# Patient Record
Sex: FEMALE | Race: WHITE
[De-identification: names, ages, dates, MRNs, and addresses within clinical notes are randomized per-mention and may not be internally consistent; named-entity substitution may affect disease eponyms.]

---

## 2019-06-28 ENCOUNTER — HOSPITAL ENCOUNTER (EMERGENCY)
Dept: HOSPITAL 72 - EMR | Age: 74
Discharge: HOME | End: 2019-06-28
Payer: MEDICARE

## 2019-06-28 VITALS — SYSTOLIC BLOOD PRESSURE: 200 MMHG | DIASTOLIC BLOOD PRESSURE: 86 MMHG

## 2019-06-28 VITALS — BODY MASS INDEX: 18.25 KG/M2 | WEIGHT: 103 LBS | HEIGHT: 63 IN

## 2019-06-28 VITALS — DIASTOLIC BLOOD PRESSURE: 74 MMHG | SYSTOLIC BLOOD PRESSURE: 150 MMHG

## 2019-06-28 DIAGNOSIS — L03.116: Primary | ICD-10-CM

## 2019-06-28 DIAGNOSIS — I10: ICD-10-CM

## 2019-06-28 DIAGNOSIS — Z88.6: ICD-10-CM

## 2019-06-28 PROCEDURE — 99283 EMERGENCY DEPT VISIT LOW MDM: CPT

## 2019-06-28 NOTE — EMERGENCY ROOM REPORT
History of Present Illness


General


Chief Complaint:  Skin Rash/Abscess


Source:  Patient





Present Illness


HPI


74 YO Female presents to the ED c/o blister on the top of the left foot that is 

infected. She reports erythema and warmth. Pt. states she has had similar 

symptoms in the past and her PCP Dr. Benson rx'd her a topical cream which 

resolved her sx's. Pt. does not know the name of the cream.  Pt. denies fevers, 

chills or swollen tender lymph nodes. Denies lesions/rashes elsewhere on the 

body. Denies new medications or body washes or creams. Denies swelling of the 

lips, tongue , throat or airway. Denies wheezing, or shortness of breath.  

Denies recent travel, recent illness or ill contacts. She denies  oral lesions, 

or sloughing of the skin. She has an elevated BP and reports she hasnt taken 

her medication for HTN yet. denies dizziness, imbalance, HA, eye pain or 

photophobia.


Allergies:  


Coded Allergies:  


     MORPHINE (Verified  Allergy, Unknown, 6/28/19)





Patient History


Past Medical History:  see triage record


Past Surgical History:  none


Pertinent Family History:  none


Pregnant Now:  No


Reviewed Nursing Documentation:  PMH: Agreed; PSxH: Agreed





Nursing Documentation-PMH


Past Medical History:  No History, Except For


Hx Hypertension:  Yes





Review of Systems


All Other Systems:  negative except mentioned in HPI





Physical Exam





Vital Signs








  Date Time  Temp Pulse Resp B/P (MAP) Pulse Ox O2 Delivery O2 Flow Rate FiO2


 


6/28/19 17:59 98.1 83 19 200/86 (124) 99 Room Air  








Sp02 EP Interpretation:  reviewed, normal


General Appearance:  no apparent distress, alert, GCS 15, non-toxic


Head:  normocephalic, atraumatic


Eyes:  bilateral eye normal inspection, bilateral eye PERRL


ENT:  hearing grossly normal, normal voice


Neck:  full range of motion


Respiratory:  lungs clear, normal breath sounds, speaking full sentences


Cardiovascular #1:  regular rate, rhythm, normal capillary refill, edema - left 

foot is swollen with some erythema and warmth up to the midfoot area. NVI


Musculoskeletal:  back normal, gait/station normal, normal range of motion, 

inflammation - distal half of the left foot, some erythema and warmth as well 

as small 1 inch wound on the dorsal aspect noted.


Neurologic:  alert, oriented x3, responsive, motor strength/tone normal, 

sensory intact, speech normal, grossly normal


Psychiatric:  judgement/insight normal


Skin:  other - Swelling/inflammation of the distal half of the left foot, some 

erythema and warmth as well as small 1 inch wound on the dorsal aspect noted.





Medical Decision Making


PA Attestation


Dr. Glover is my supervising Physician whom patient management has been 

discussed with.


Diagnostic Impression:  


 Primary Impression:  


 Cellulitis


 Qualified Codes:  L03.116 - Cellulitis of left lower limb


 Additional Impression:  


 Elevated blood pressure reading


ER Course


74 YO Female presents to the ED c/o blister on the top of the left foot that is 

infected. She reports erythema and warmth. Pt. states she has had similar 

symptoms in the past and her PCP Dr. Benson rx'd her a topical cream which 

resolved her sx's. Pt. does not know the name of the cream.  Pt. denies fevers, 

chills or swollen tender lymph nodes. Denies lesions/rashes elsewhere on the 

body. Denies new medications or body washes or creams. Denies swelling of the 

lips, tongue , throat or airway. Denies wheezing, or shortness of breath.  

Denies recent travel, recent illness or ill contacts. She denies  oral lesions, 

or sloughing of the skin. She has an elevated BP and reports she hasnt taken 

her medication for HTN yet. denies dizziness, imbalance, HA, eye pain or 

photophobia. 





Ddx considered but are not limited to cellulitis, SJS, drug reaction, Burn, 

insect bites,  fracture, d/L, gout





Vital signs: Pt. has elevated BP in the 200's/100's. the remaining VS are WNL, 

pt. is afebrile





H&PE are most consistent with cellulitis of the dorsal left foot.





-Elevated BP secondary to not taking BP medication this evening yet.  





ORDERS: none required at this time, the diagnosis is clinical





ED INTERVENTIONS: None required at this time. 





DISCHARGE: At this time pt. is stable for d/c to home. Will provide printed 

patient care instructions, and any necessary prescriptions. Care plan and 

follow up instructions have been discussed with the patient prior to discharge.





Last Vital Signs








  Date Time  Temp Pulse Resp B/P (MAP) Pulse Ox O2 Delivery O2 Flow Rate FiO2


 


6/28/19 17:59 98.1 83 19 200/86 (124) 99 Room Air  








Disposition:  HOME, SELF-CARE


Condition:  Stable


Scripts


Trimethoprim/Sulfamethoxazole 160/800* (BACTRIM DS TABLET*) 1 Each Tablet


1 TAB ORAL TWICE A DAY for 7 Days, #14 TAB


   Prov: Beverly Garsia         6/28/19 


Cephalexin* (KEFLEX*) 500 Mg Capsule


500 MG ORAL EVERY 12 HOURS for 7 Days, #14 CAP 0 Refills


   Prov: Beverly Garsia         6/28/19 


Bacitracin/Polymyxin B Sulfate (BACITRACIN-POLYMYXIN OINTMENT) 28.35 Gm 

Oint...g.


1 APPLIC TP BID, #28.3 GM


   Prov: Beverly Garsia         6/28/19


Patient Instructions:  Cellulitis, Easy-to-Read





Additional Instructions:  


Take medications as directed. 





 ** Follow up with a Primary Care Provider in 3-5 days, even if your symptoms 

have resolved. ** 


--Please review list of primary care clinics, if you do not already have a 

primary care provider





Return sooner to ED if new symptoms occur, or current symptoms become worse. 











- Please note that this Emergency Department Report was dictated using [employer] technology software, occasionally this can lead to 

erroneous entry secondary to interpretation by the dictation equipment.











Beverly Garsia Jun 28, 2019 18:17

## 2019-06-28 NOTE — NUR
ED Nurse Note:



pt walked in to ER from home for opened blister on Lt foot. no drainage and no 
s/s of infection. pt aao x4 and ambulatory. calm and cooperative.